# Patient Record
Sex: MALE | URBAN - METROPOLITAN AREA
[De-identification: names, ages, dates, MRNs, and addresses within clinical notes are randomized per-mention and may not be internally consistent; named-entity substitution may affect disease eponyms.]

---

## 2017-05-25 ENCOUNTER — HISTORICAL (OUTPATIENT)
Dept: ADMINISTRATIVE | Facility: HOSPITAL | Age: 57
End: 2017-05-25

## 2017-05-25 LAB
BUN SERPL-MCNC: 13 MG/DL (ref 7–18)
POC CREATININE: 0.9 MG/DL (ref 0.6–1.3)

## 2017-09-08 ENCOUNTER — HISTORICAL (OUTPATIENT)
Dept: ADMINISTRATIVE | Facility: HOSPITAL | Age: 57
End: 2017-09-08

## 2017-09-14 LAB
FINAL CULTURE: NORMAL
FINAL CULTURE: NORMAL

## 2022-04-30 NOTE — DISCHARGE SUMMARY
DISCHARGE DATE:  09/12/2017    DISCHARGE DIAGNOSIS:    1. Infrarenal aortic occlusion.  2. Hypertension.  3. Hyperlipidemia.   4. Coronary artery disease.    PROCEDURES PERFORMED:  Aorto-bifemoral bypass with inferior mesenteric artery reimplant on 9/6/2017.    BRIEF HISTORY:  The patient is a 57-year-old male with lifestyle limiting claudication.  Through evaluation he was found to have nonpalpable femoral pulses and CT abdomen and pelvis revealed infrarenal aortic occlusion.  Operation was indicated for revascularization.    HOSPITAL COURSE:  The patient was seen at [facility] on 9/6/2017.  He was taken to the operating room and underwent aorto-bifemoral bypass with inferior mesenteric artery reimplantation.  The patient did well with the procedure without any acute events or complications and was transferred in stable condition to the ICU.  While in the ICU the patient did well immediately postoperatively.  However, he began to have some degree of hypotension and tachycardia along with decreased urine output.  Ultimately the patient was under-resuscitated and after aggressive IV fluids the patient's urine output resumed to a normal rate and his creatinine which was initially increased to 1.7 had immediately returned to the normal range of 1.  The patient's tachycardia and hypotension both resolved as well.  The patient was transferred to the floor around postop day #3 and he began ambulating in the palacios without difficulties.  Pain was well controlled.  He had easily palpable pedal pulses and he was diuresing well with very large volume urine output which tapered to a normal range.  The patient was tolerating a regular diet with bowel movements and flatus and he had remained afebrile and hemodynamically stable.  The patient on postoperative day #6 was stating he was ready for discharge home.  Discharge disposition was discussed in detail with the patient.  The patient's expressed  understanding.  All questions were     answered and addressed. His wound is clean, dry and intact without any signs of infection or bleeding.  The patient will follow up in clinic in roughly ten days for staple removal.        ______________________________  MD MARIA GUADALUPE Marie III/JOAN  DD:  09/12/2017  Time:  09:31AM  DT:  09/13/2017  Time:  03:09PM  Job #:  [id number]

## 2022-04-30 NOTE — H&P
HISTORY OF PRESENT ILLNESS:  This is a 57 year old gentleman, a patient of Dr. Matthew Garcia, who apparently had surgery late last evening, aorto-fem bypass, the nurses report that reimplantation of one of the mesenteric arteries.  There is no H&P on the chart.  There are no records whatsoever of any surgery being performed.       THIS GENTLEMAN IS UP HERE IN THE INTENSIVE CARE UNIT WITH NO RECORDS WHATSOEVER.     He is awake and alert.  He says he feels like he has been run over by a truck.  He denies nausea.  He has had some pain at the operative site.  He tells me that he was having lower extremity claudication for the last 10 to 12 years and finally decided to have something done about it.  He was a former smoker.  He quit in February 2016 after suffering a cerebrovascular accident.    ALLERGIES:  NSAIDs.    MEDICATIONS:  Aspirin 81 mg a day, atorvastatin 80 daily, bupropion 300 mg h.s., Plavix 75 daily, escitalopram 20 daily, lisinopril 5 daily, metoprolol 25 b.i.d., nitroglycerin sublingually, fish oil 1000 mg daily, omeprazole 40 daily, ranitidine 150 b.i.d., rosuvastatin 20 daily.    PAST MEDICAL HISTORY:  Coronary artery disease, hypertension, previous history of cerebrovascular accident without significant sequelae, hyperlipidemia.    PAST SURGICAL HISTORY:  Coronary artery bypass graft in 2012 and now the aorto-fem bypass.    SOCIAL HISTORY:  He resides in the [facility].  He is a retired  because of his current illnesses.  He resides with his wife.  He smoked up to two packs per day for approximately 40 years.  He quit smoking in February 2016 following his stroke.    PHYSICAL EXAMINATION:  VITAL SIGNS:  Temperature is 37.5, blood pressure 126/75, heart rate 94, respiratory rate 14 and unlabored, sating 98% on 2 liters nasal cannula.   HEENT:  Nasogastric tube in place.  Nasal cannula in place.   CHEST:  Clear.   HEART:  Regular in rhythm.   ABDOMEN:  Soft and silent.  He has a  midabdominal incision with minimal soiling from blood stains.  He has bilateral femoral area bandages which are dry and intact.   EXTREMITIES:  Lower extremities are warm to touch with dorsal pedal pulses.   NEURO:  Awake, alert, pleasant.    LABORATORY DATA:    Electrolytes within normal limits, BUN 18, creatinine 1.0, H&H 13 and 39, white count 17,700, platelet count 181.    RADIOLOGY:    Looking at a CT that was done back in May, it showed severe inflow disease with occlusion of the abdominal aorta distal to the inferior mesenteric artery origin, with associated mesenteric and renal artery stenosis.    IMPRESSION:    1. Presumed aorto-fem bypass and inferior mesenteric artery reimplantation (no notes or records on this chart).  2. Hypertension.  3. Hyperlipidemia.  4. History of previous cerebrovascular accident in February 2016.  5. History of heavy tobacco abuse.      PLAN:    As per Dr. Garcia, patient looks stable for downgrade to the floor.  Continue to advance diet per Dr. Garcia and deep vein thrombosis prophylaxis per Surgery.        ______________________________  G. MD CAMERON Pierre/CS  DD:  09/07/2017  Time:  06:53AM  DT:  09/07/2017  Time:  07:29AM  Job #:  [id number]    The H&P was reviewed, the patient was examined, and the following changes to the patients condition are noted:  ______________________________________________________________________________  ______________________________________________________________________________  ______________________________________________________________________________  [  ] No changes to the patient's condition:      ______________________________                                             ___________________  PHYSICIAN SIGNATURE                                                             DATE/TIME

## 2022-04-30 NOTE — OP NOTE
DATE OF SURGERY:    09/06/2017    SURGEON:  Matthew Garcia MD    PREOPERATIVE DIAGNOSIS:  Peripheral arterial disease with aortic occlusion.    POSTOPERATIVE DIAGNOSIS:  Peripheral arterial disease with aortic occlusion.    PROCEDURE:  Aortobifemoral bypass (16 X 8 mm bifurcated Dacron) with inferior mesenteric artery reimplantation.    ANESTHESIA:  General endotracheal anesthesia.    ESTIMATED BLOOD LOSS:  500 ml.    COMPLICATIONS:  None.    INDICATION FOR PROCEDURE:  The patient is a 57-year-old male with multiple co-morbidities including carotid stenosis, coronary artery disease and peripheral arterial disease.  The patient has lifestyle limiting short distance claudication and work-up revealed infrarenal aortic occlusion.  Operation is indicated for revascularization.    PROCEDURE IN DETAIL:  After informed consent was obtained, the patient was taken to the Operating Room, placed in supine position and prepped and draped in sterile fashion.  We made an incision over the right common femoral artery and carried this down with Bovie electrocauterization through the subcutaneous tissues.  We identified the common femoral artery and circumferentially dissected this and controlled this with a vessel loop.  We carried dissection distally identifying and controlling the profunda femoral artery and superficial femoral artery.  We made a small incision in the left groin and carried this down with Bovie electrocauterization, identifying and controlling the common femoral artery, profunda femoral artery and superficial femoral artery.  We then made a midline laparotomy incision from the xiphoid process to just inferior to the umbilicus.  We carried this down with Bovie electrocauterization through the subcutaneous tissues.  We were able to enter the abdominal cavity without difficulty.  We placed a Houghton retractor followed by an Omni.  We retracted the colon cranially and eviscerated the small bowel to the right.   We were able to enter the retroperitoneum, identify and control the renal vein and aorta.  We identified and circumferentially dissected the HILTON.  We carried our dissection toward the right side of the aorta into the aortic bifurcation.  We were able to fashion tunnels to both of the groins and hold these in place with red rubber catheters.  The patient was heparinized appropriately.  Clamps were placed on the infrarenal aorta and this was transected.  The stump distally was oversewn with a 3-0 Prolene suture in two layers.  Focal endarterectomy was performed in the infrarenal aorta just distal to the renal arteries and graft was cut to appropriate size.  3-0 Prolene suture was used in circumferential fashion to perform an end-to-end anastomosis.  Anastomosis was complete and anastomosis was hemostatic.  We brought the right limb to the groin through pre-made tunnel and all of the slack was removed.  Clamps were placed on profunda femoral artery, the superficial femoral artery and the common femoral artery.  #11 blade scalpel was used to perform arteriotomy that was lengthened with Hale scissors.  There was minimal disease in the groin.  The graft was spatulated appropriately.  5-0 Prolene sutures were used in circumferential fashion to perform the anastomosis.  Prior to completion of the anastomosis, the usual flush maneuvers were performed.  Anastomosis was completed and anastomosis was hemostatic.  There was a palpable pulse in the SFA distal to the anastomosis.  We then turned our attention toward the left groin and in a similar fashion brought the left graft limb down into the groin with the pre-made tunnel and the slack was removed.  Clamps were placed on the femoral vessels once again.  #11 blade scalpel was used to perform arteriotomies and was lengthened with Hale scissors.  The graft was spatulated appropriately and 5-0 Prolene suture was used in circumferential fashion to perform the anastomosis.  Prior  to completion of the anastomosis, the usual flush maneuvers were performed.  Anastomosis was completed and anastomosis was hemostatic.  There was a palpable pulse in the SFA just distal to the anastomosis.  We then turned attention back toward the abdomen.  The patient on prior CAT scan had a robust HILTON and this was fed in antegrade fashion.  We elected at this point to reimplant the HILTON.  The HILTON was transected and its attachment to the native aorta was oversewn with a silk suture and aortic punch was used to the left graft limb and the HILTON was anastomosed in end-to-side fashion with 6-0 Prolene suture in circumferential fashion.  The anastomosis was completed after the usual flush maneuvers and the anastomosis was hemostatic.  There was a palpable pulse in the HILTON.  All wounds were irrigated with copious fluids.  The retroperitoneum was reapproximated with 2-0 Vicryl in running fashion and the abdominal fashion was closed with #1 loop PDS times two.  The skin and subcutaneous tissue were closed with staples.  The groins were irrigated and closed in multiple layers with 2-0 Vicryl suture in running fashion followed by 3-0 Vicryl suture in running fashion followed by 4-0 Monocryl in subcuticular fashion followed by Dermabond.  Sterile dressings were applied.  The patient tolerated the procedure well without complication and he had palpable pedal pulses at the conclusion of the procedure.        ______________________________  MD MARIA GUADALUPE Marie III/KARMEN  DD:  09/06/2017  Time:  08:59PM  DT:  09/07/2017  Time:  01:02PM  Job #:  [id number]